# Patient Record
Sex: FEMALE | URBAN - METROPOLITAN AREA
[De-identification: names, ages, dates, MRNs, and addresses within clinical notes are randomized per-mention and may not be internally consistent; named-entity substitution may affect disease eponyms.]

---

## 2021-09-02 ENCOUNTER — APPOINTMENT (RX ONLY)
Dept: URBAN - METROPOLITAN AREA CLINIC 124 | Facility: CLINIC | Age: 81
Setting detail: DERMATOLOGY
End: 2021-09-02

## 2021-09-03 ENCOUNTER — APPOINTMENT (RX ONLY)
Dept: URBAN - METROPOLITAN AREA CLINIC 124 | Facility: CLINIC | Age: 81
Setting detail: DERMATOLOGY
End: 2021-09-03

## 2021-09-03 DIAGNOSIS — Z41.9 ENCOUNTER FOR PROCEDURE FOR PURPOSES OTHER THAN REMEDYING HEALTH STATE, UNSPECIFIED: ICD-10-CM

## 2021-09-03 PROCEDURE — ? JUVEDERM ULTRA PLUS INJECTION

## 2021-09-03 PROCEDURE — ? RESTYLANE LYFT INJECTION

## 2021-09-03 PROCEDURE — ? BOTOX (U OR CC)

## 2021-09-03 NOTE — PROCEDURE: BOTOX (U OR CC)
Glabellar Complex Units: 0
Document As Units Or Cc?: units
Consent: Written consent obtained. Risks include but not limited to lid/brow ptosis, bruising, swelling, diplopia, temporary effect, incomplete chemical denervation.
Including Pricing Information In The Note: No
Depressor Anguli Oris Units: 4
Detail Level: Detailed
Post-Care Instructions: Patient instructed to not lie down for 4 hours and limit physical activity for 24 hours.
Price (Use Numbers Only, No Special Characters Or $): 60

## 2021-09-03 NOTE — PROCEDURE: RESTYLANE LYFT INJECTION
Marionette Lines Filler  Volume In Cc: 0
Detail Level: Detailed
Number Of Syringes (Required For Inventory): 1
Procedural Text: The filler was administered to the treatment areas noted above.
Topical Anesthesia?: 23% lidocaine, 7% tetracaine
Consent: Written consent obtained. Risks include but not limited to bruising, beading, irregular texture, ulceration, infection, allergic reaction, scar formation, incomplete augmentation, temporary nature, procedural pain.
Include Cannula Information In Note?: No
Map Statement: See Attach Map for Complete Details
Post-Care Instructions: Patient instructed to apply ice to reduce swelling. Arnica and fresh pineapple will reduce bruising and swelling. Avoid exercise/sweating for 24 hours.
Price (Use Numbers Only, No Special Characters Or $): 522
Filler: Melodie Joshi

## 2021-09-03 NOTE — PROCEDURE: JUVEDERM ULTRA PLUS INJECTION
Additional Area 3 Volume In Cc: 0
Number Of Syringes (Required For Inventory): 2
Use Map Statement For Sites (Optional): No
Procedural Text: The filler was administered to the treatment areas noted above.
Detail Level: Detailed
Topical Anesthesia?: 23% lidocaine, 7% tetracaine
Consent: Written consent obtained. Risks include but not limited to bruising, beading, irregular texture, ulceration, infection, allergic reaction, scar formation, incomplete augmentation, temporary nature, procedural pain.
Post-Care Instructions: Patient instructed to apply ice to reduce swelling. Arnica and fresh pineapple will reduce bruising and swelling. Avoid exercise/sweating for 24 hours.
Filler: Juvederm Ultra Plus
Map Statment: See Attach Map for Complete Details
Price (Use Numbers Only, No Special Characters Or $): 4410